# Patient Record
(demographics unavailable — no encounter records)

---

## 2025-01-28 NOTE — ASSESSMENT
[FreeTextEntry1] : Asthma exacerbation secondary to respiratory infection. Start: Albuterol Sulfate (2.5 MG/3ML) 0.083% Inhalation Nebulization Solution; INHALE 1 0.083% Every 8 hours Start: Albuterol Sulfate  (90 Base) MCG/ACT Inhalation Aerosol Solution; INHALE 2 PUFFS EVERY 4 HOURS AS NEEDED FOR COUGH AND WHEEZE Start: PredniSONE 20 MG Oral Tablet; TAKE 2  TABLET Oral OD for 7 days. Continue Augmentin as prescribed  F/U within a week

## 2025-01-28 NOTE — HISTORY OF PRESENT ILLNESS
[(# ___ in the past year)] : [unfilled] visits to the emergency room in the past year [Shortness of Breath] : shortness of breath [Cough] : cough [1x /month] : 1x /month [Some Limitation] : some limitation [> or = 2 days/wk] : > than or = 2 days/week [0 - 1/year] : 0 - 1/year [de-identified] : Carolina presented for a sick visit. On Thursday, she began to feel sick, experiencing the onset of a cold. She consulted her primary care provider, who prescribed Augmentin. She reports experiencing congestion and shortness of breath when walking up and down the stairs. Carolina mentions that her symptoms improve only when she is seated in a cool environment. Currently, she is on the third day of Augmentin and has observed the expulsion of green mucus, She would feel a lot of pressure in her nasal passages. Additionally, she notes that blowing her nose results in a yellowish-green mucus discharge. She continues to experience shortness of breath when walking up and down stairs. She does have a history of asthma and is not on any controller medication.

## 2025-01-28 NOTE — REASON FOR VISIT
[Sick Visit] : a sick visit [Congestion] : congestion [Asthma] : asthma [Cough] : cough [Shortness of Breath] : shortness of breath

## 2025-01-28 NOTE — REVIEW OF SYSTEMS
[Cough] : cough [Nl] : Genitourinary [Difficulty Breathing] : dyspnea [SOB with Exertion] : dyspnea on exertion

## 2025-01-28 NOTE — IMPRESSION
[Pulmonary Function Test] : Pulmonary Function Test [Moderate] : (moderate) [FreeTextEntry1] : FEV1 is 61% of predicted that reverses after albuterol by 6% to 66% of predicted. POST 66%

## 2025-01-28 NOTE — HISTORY OF PRESENT ILLNESS
[(# ___ in the past year)] : [unfilled] visits to the emergency room in the past year [Shortness of Breath] : shortness of breath [Cough] : cough [1x /month] : 1x /month [Some Limitation] : some limitation [> or = 2 days/wk] : > than or = 2 days/week [0 - 1/year] : 0 - 1/year [de-identified] : Carolina presented for a sick visit. On Thursday, she began to feel sick, experiencing the onset of a cold. She consulted her primary care provider, who prescribed Augmentin. She reports experiencing congestion and shortness of breath when walking up and down the stairs. Carolina mentions that her symptoms improve only when she is seated in a cool environment. Currently, she is on the third day of Augmentin and has observed the expulsion of green mucus, She would feel a lot of pressure in her nasal passages. Additionally, she notes that blowing her nose results in a yellowish-green mucus discharge. She continues to experience shortness of breath when walking up and down stairs. She does have a history of asthma and is not on any controller medication.

## 2025-02-19 NOTE — HISTORY OF PRESENT ILLNESS
[TextBox_4] : Pt c/o pain with intercourse due to vaginal dryness. She tried estrogen cream but it was too messy, vagifem didn't help. She wants to know other options. no vb, takes vit exercises 3w [Mammogramdate] : 11/2023 [PapSmeardate] : 2022 [BoneDensityDate] : 7/2020 [TextBox_37] : nml [ColonoscopyDate] : ? [Yes] : Patient has concerns regarding sex [Currently Active] : currently active [Men] : men [FreeTextEntry1] : pain

## 2025-02-19 NOTE — PHYSICAL EXAM
[Appropriately responsive] : appropriately responsive [Alert] : alert [No Acute Distress] : no acute distress [Soft] : soft [Non-tender] : non-tender [Non-distended] : non-distended [No HSM] : No HSM [No Lesions] : no lesions [No Mass] : no mass [Oriented x3] : oriented x3 [Examination Of The Breasts] : a normal appearance [No Masses] : no breast masses were palpable [Labia Majora] : normal [Labia Minora] : normal [Normal] : normal [Tenderness] : nontender [Enlarged ___ wks] : not enlarged [Mass ___ cm] : no uterine mass was palpated [Uterine Adnexae] : non-palpable [No Tenderness] : no tenderness [FreeTextEntry5] : small spec used, pap done [FreeTextEntry9] : guaiac-